# Patient Record
Sex: FEMALE | Race: AMERICAN INDIAN OR ALASKA NATIVE
[De-identification: names, ages, dates, MRNs, and addresses within clinical notes are randomized per-mention and may not be internally consistent; named-entity substitution may affect disease eponyms.]

---

## 2020-01-27 ENCOUNTER — HOSPITAL ENCOUNTER (EMERGENCY)
Dept: HOSPITAL 5 - ED | Age: 55
Discharge: HOME | End: 2020-01-27
Payer: SELF-PAY

## 2020-01-27 VITALS — DIASTOLIC BLOOD PRESSURE: 66 MMHG | SYSTOLIC BLOOD PRESSURE: 139 MMHG

## 2020-01-27 DIAGNOSIS — E11.9: ICD-10-CM

## 2020-01-27 DIAGNOSIS — I10: ICD-10-CM

## 2020-01-27 DIAGNOSIS — R42: Primary | ICD-10-CM

## 2020-01-27 DIAGNOSIS — Z79.899: ICD-10-CM

## 2020-01-27 LAB
BACTERIA #/AREA URNS HPF: (no result) /HPF
BASOPHILS # (AUTO): 0 K/MM3 (ref 0–0.1)
BASOPHILS NFR BLD AUTO: 0.4 % (ref 0–1.8)
BILIRUB UR QL STRIP: (no result)
BLOOD UR QL VISUAL: (no result)
BUN SERPL-MCNC: 8 MG/DL (ref 7–17)
BUN/CREAT SERPL: 11 %
CALCIUM SERPL-MCNC: 9 MG/DL (ref 8.4–10.2)
EOSINOPHIL # BLD AUTO: 0 K/MM3 (ref 0–0.4)
EOSINOPHIL NFR BLD AUTO: 0.7 % (ref 0–4.3)
HCT VFR BLD CALC: 38.9 % (ref 30.3–42.9)
HEMOLYSIS INDEX: 10
HGB BLD-MCNC: 12.3 GM/DL (ref 10.1–14.3)
LYMPHOCYTES # BLD AUTO: 1.8 K/MM3 (ref 1.2–5.4)
LYMPHOCYTES NFR BLD AUTO: 31.6 % (ref 13.4–35)
MCHC RBC AUTO-ENTMCNC: 32 % (ref 30–34)
MCV RBC AUTO: 69 FL (ref 79–97)
MONOCYTES # (AUTO): 0.4 K/MM3 (ref 0–0.8)
MONOCYTES % (AUTO): 6.7 % (ref 0–7.3)
MUCOUS THREADS #/AREA URNS HPF: (no result) /HPF
PH UR STRIP: 6 [PH] (ref 5–7)
PLATELET # BLD: 209 K/MM3 (ref 140–440)
PROT UR STRIP-MCNC: (no result) MG/DL
RBC # BLD AUTO: 5.67 M/MM3 (ref 3.65–5.03)
RBC #/AREA URNS HPF: 1 /HPF (ref 0–6)
UROBILINOGEN UR-MCNC: < 2 MG/DL (ref ?–2)
WBC #/AREA URNS HPF: < 1 /HPF (ref 0–6)

## 2020-01-27 PROCEDURE — 36415 COLL VENOUS BLD VENIPUNCTURE: CPT

## 2020-01-27 PROCEDURE — 82962 GLUCOSE BLOOD TEST: CPT

## 2020-01-27 PROCEDURE — 85025 COMPLETE CBC W/AUTO DIFF WBC: CPT

## 2020-01-27 PROCEDURE — 81001 URINALYSIS AUTO W/SCOPE: CPT

## 2020-01-27 PROCEDURE — 80048 BASIC METABOLIC PNL TOTAL CA: CPT

## 2020-01-27 NOTE — EMERGENCY DEPARTMENT REPORT
ED Dizziness HPI





- General


Chief Complaint: Dizziness


Stated Complaint: DIZZY


Time Seen by Provider: 01/27/20 12:47


Source: patient


Mode of arrival: Wheelchair


Limitations: No Limitations





- History of Present Illness


Initial Comments: 





54-year-old -American female presents to the emergency room complaining 

of acute onset of dizziness.  Patient states she was delivering her sister to 

dialysis when she started to get dizzy.  Patient denies any nausea vomiting no 

headache.  Patient denies any spinning of the room.  Patient denies any chest 

pain shortness of breathing.  Patient reports she has a history of hypertension 

and takes her medications of amlodipine 5 mg and hydrochlorothiazide 12.5 mg.  

Patient reports that the dizziness is improving.


MD Complaint: dizziness


-: This morning


Timing: sudden onset


Description: off-balance


History of Same: No (hx/o vertigo)


History of Trauma: No


Improves With: nothing


Worsens With: nothing


Associated Symptoms: denies other symptoms





- Related Data


                                  Previous Rx's











 Medication  Instructions  Recorded  Last Taken  Type


 


Neomy/Polymyx B/Hc (Otic) Soln 4 drops AD TID 7 Days #1 bottle 09/19/19 Unknown 

Rx





[Cortisporin (Otic) Soln]    











                                    Allergies











Allergy/AdvReac Type Severity Reaction Status Date / Time


 


No Known Allergies Allergy   Verified 09/19/19 15:42














ED Review of Systems


ROS: 


Stated complaint: DIZZY


Other details as noted in HPI





Comment: All other systems reviewed and negative





ED Past Medical Hx





- Past Medical History


Previous Medical History?: Yes


Hx Hypertension: Yes


Hx Diabetes: Yes (type 2)





- Surgical History


Past Surgical History?: No





- Social History


Smoking Status: Never Smoker


Substance Use Type: None





- Medications


Home Medications: 


                                Home Medications











 Medication  Instructions  Recorded  Confirmed  Last Taken  Type


 


Neomy/Polymyx B/Hc (Otic) Soln 4 drops AD TID 7 Days #1 bottle 09/19/19  Unknown

Rx





[Cortisporin (Otic) Soln]     














ED Physical Exam





- General


Limitations: No Limitations


General appearance: alert, in no apparent distress





- Head


Head exam: Present: atraumatic, normocephalic





- Eye


Eye exam: Present: normal appearance





- ENT


ENT exam: Present: mucous membranes moist





- Neck


Neck exam: Present: full ROM





- Extremities Exam


Extremities exam: Present: normal inspection, full ROM





- Back Exam


Back exam: Present: normal inspection, full ROM





- Neurological Exam


Neurological exam: Present: alert, oriented X3, normal gait





- Expanded Neurological Exam


  ** Expanded


Cranial nerves: EOM's Intact: Normal, Gag Reflex: Normal, Tongue Deviation: 

Normal, Nystagmus: Normal, Facial Sensation: Normal, Facial Palsy with Forehead 

Movement: Normal, Facial Palsy without Forehead Movement: Normal


Cerebellar function: Finger to Nose: Normal, Heel to Shin: Normal, Romberg: 

Normal


Upper motor neuron: Swapnil Neglect: Normal, Pronator Drift: Normal, Sensory 

Extinction: Normal


Sensory exam: Upper Extremity Light Touch: Normal, Upper Extremity Pin Prick: 

Normal, Upper Extremity Temperature: Normal, UE 2 Point Discrimination: Normal, 

Lower Extremity Light Touch: Normal, Lower Extremity Pin Prick: Normal, Lower 

Extremity Temperature: Normal, LE 2 Point Discrimination: Normal


Motor strength exam: RUE: 5, LUE: 5, RLE: 5, LLE: 5


Best Eye Response (Jessica): (4) open spontaneously


Best Motor Response ([place]): (6) obeys commands


Best Verbal Response ([place]): (5) oriented


[place] Total: 15





- Psychiatric


Psychiatric exam: Present: normal affect, normal mood





- Skin


Skin exam: Present: warm, dry, intact, normal color.  Absent: rash





ED Course





                                   Vital Signs











  01/27/20





  11:46


 


Temperature 98.0 F


 


Pulse Rate 78


 


Respiratory 16





Rate 


 


Blood Pressure 158/78


 


O2 Sat by Pulse 100





Oximetry 














ED Medical Decision Making





- Lab Data


Result diagrams: 


                                 01/27/20 13:14





                                 01/27/20 13:14








                                Laboratory Tests











  01/27/20 01/27/20





  13:14 13:14


 


WBC  5.7 


 


RBC  5.67 H 


 


Hgb  12.3 


 


Hct  38.9 


 


MCV  69 L 


 


MCH  22 L 


 


MCHC  32 


 


RDW  16.2 H 


 


Plt Count  209 


 


Lymph % (Auto)  31.6 


 


Mono % (Auto)  6.7 


 


Eos % (Auto)  0.7 


 


Baso % (Auto)  0.4 


 


Lymph #  1.8 


 


Mono #  0.4 


 


Eos #  0.0 


 


Baso #  0.0 


 


Seg Neutrophils %  60.6 


 


Seg Neutrophils #  3.4 


 


Sodium   137


 


Potassium   4.2


 


Chloride   101.7


 


Carbon Dioxide   22


 


Anion Gap   18


 


BUN   8


 


Creatinine   0.7


 


Estimated GFR   > 60


 


BUN/Creatinine Ratio   11


 


Glucose   168 H


 


Calcium   9.0














- Medical Decision Making





54-year-old -American female presents to the emergency room complaining 

of acute onset of dizziness.  Patient states she was delivering her sister to 

dialysis when she started to get dizzy.  Patient denies any nausea vomiting no 

headache.  Patient denies any spinning of the room.  Patient denies any chest 

pain shortness of breathing.  Patient reports she has a history of hypertension 

and takes her medications of amlodipine 5 mg and hydrochlorothiazide 12.5 mg.  

Patient reports that the dizziness is improving.














Patient comes to me at 1625 to inform you that she may have taken 2 doses of her

metformin and that could be the reason why she dizzy.  Patient reports dizziness

has resolved.


Critical care attestation.: 


If time is entered above; I have spent that time in minutes in the direct care 

of this critically ill patient, excluding procedure time.








ED Disposition


Clinical Impression: 


 Dizziness





Disposition: DC-01 TO HOME OR SELFCARE


Is pt being admited?: No


Does the pt Need Aspirin: No


Condition: Stable


Instructions:  Dizziness (ED)


Additional Instructions: 


All labs were within normal limits.


Referrals: 


GRISELDA ELLIS MD [Staff Physician] - 3-5 Days

## 2020-01-27 NOTE — EMERGENCY DEPARTMENT REPORT
Blank Doc





- Documentation


Documentation: 





54-year-old female that presents with dizziness.  





This initial assessment/diagnostic orders/clinical plan/treatment(s) is/are 

subject to change based on patient's health status, clinical progression and re-

assessment by fellow clinical providers in the ED.  Further treatment and workup

at subsequent clinical providers discretion.  Patient/guardians urged not to 

elope from the ED as their condition may be serious if not clinically assessed 

and managed.  Initial orders include:


1- Patient sent to [facility] for further evaluation and treatment


2- labs


3- UA

## 2023-08-16 ENCOUNTER — OFFICE VISIT (OUTPATIENT)
Dept: URBAN - METROPOLITAN AREA CLINIC 118 | Facility: CLINIC | Age: 58
End: 2023-08-16

## 2025-07-31 ENCOUNTER — OFFICE VISIT (OUTPATIENT)
Dept: URBAN - METROPOLITAN AREA CLINIC 88 | Facility: CLINIC | Age: 60
End: 2025-07-31

## 2025-08-15 ENCOUNTER — OFFICE VISIT (OUTPATIENT)
Dept: URBAN - METROPOLITAN AREA CLINIC 118 | Facility: CLINIC | Age: 60
End: 2025-08-15